# Patient Record
Sex: MALE | URBAN - METROPOLITAN AREA
[De-identification: names, ages, dates, MRNs, and addresses within clinical notes are randomized per-mention and may not be internally consistent; named-entity substitution may affect disease eponyms.]

---

## 2022-08-25 ENCOUNTER — NURSE TRIAGE (OUTPATIENT)
Dept: NURSING | Facility: CLINIC | Age: 22
End: 2022-08-25

## 2022-08-26 NOTE — TELEPHONE ENCOUNTER
"TRIAGE CALL - Is this a 2nd Level Triage? NO    Nurse Triage SBAR - Patient calling   Situation: congestion   Started last night  Background:    No new clothing, new location, pets or soap. Pt [place] not know what triggers this allergies  Assessment:  Running nose  Itchiness  Red eyes  Eye swelling - eye drops helped \"clear eyes OTC\"  Pain none  Patients denies fever, rash, discharged from his eyes.  Measures taken: allegra OTC   Patient is able to speak in full long sentences without getting short of breath.  Recommended Disposition per protocol Appt in 2 weeks  Pt has not insurance - and he is not a pt of . He was advised to go for this appt to [facility] or [facility] that works with pt without insurance and Pt's  Income.     Patient verbalized understanding and agrees with plan    Deborah Schaefer RN Nurse Triage Advisor 8:56 PM 8/25/2022              Reason for Disposition    [1] Allergic conjunctivitis suspected (itchy red eyes) AND [2] no other symptoms    Runny nose, nose itching, and sneezing also present    Eye redness and itching are the only symptoms    Eye allergy is a chronic symptom (recurrent or ongoing AND present > 4 weeks)    Additional Information    Negative: Reaction to antibiotic eye drops    Negative: Chemical gets into the eye from fingers, contaminated object, spray, or splash    Negative: Eye pain    Negative: [1] Sacs of clear fluid (blisters) on whites of eyes AND [2] painful AND [3] not improved 2 hours after allergy treatment per guideline    Negative: [1] Blurred vision AND [2] new or worsening    Negative: Sacs of clear fluid (blisters) on whites of eyes or inner lids    Negative: Eyelids are very swollen (shut or almost)    Negative: [1] Taking allergy medicine > 2 days AND [2] still has pus on eyelids    Negative: [1] Taking allergy medicine > 2 days AND [2] eyes are very itchy    Negative: Contact lenses makes itching or redness worse    Protocols used: ALLERGIC " REACTIONS - GUIDELINE OLKUOGBOP-N-XR, EYE - ALLERGY-A-, NASAL ALLERGIES (HAY FEVER)-A-AH